# Patient Record
Sex: FEMALE | Race: WHITE | Employment: FULL TIME | URBAN - METROPOLITAN AREA
[De-identification: names, ages, dates, MRNs, and addresses within clinical notes are randomized per-mention and may not be internally consistent; named-entity substitution may affect disease eponyms.]

---

## 2019-04-13 ENCOUNTER — OFFICE VISIT (OUTPATIENT)
Dept: FAMILY MEDICINE CLINIC | Facility: CLINIC | Age: 64
End: 2019-04-13
Payer: MEDICARE

## 2019-04-13 VITALS
HEART RATE: 75 BPM | WEIGHT: 119.81 LBS | OXYGEN SATURATION: 99 % | BODY MASS INDEX: 18.58 KG/M2 | HEIGHT: 67.5 IN | SYSTOLIC BLOOD PRESSURE: 110 MMHG | TEMPERATURE: 98 F | RESPIRATION RATE: 18 BRPM | DIASTOLIC BLOOD PRESSURE: 74 MMHG

## 2019-04-13 DIAGNOSIS — L03.818 CELLULITIS OF OTHER SPECIFIED SITE: ICD-10-CM

## 2019-04-13 DIAGNOSIS — H00.012 HORDEOLUM EXTERNUM OF RIGHT LOWER EYELID: Primary | ICD-10-CM

## 2019-04-13 PROCEDURE — 99213 OFFICE O/P EST LOW 20 MIN: CPT | Performed by: FAMILY MEDICINE

## 2019-04-13 RX ORDER — CEFADROXIL 500 MG/1
500 CAPSULE ORAL 2 TIMES DAILY
Qty: 20 CAPSULE | Refills: 0 | Status: SHIPPED | OUTPATIENT
Start: 2019-04-13

## 2019-04-13 RX ORDER — NEOMYCIN SULFATE, POLYMYXIN B SULFATE, AND DEXAMETHASONE 3.5; 10000; 1 MG/G; [USP'U]/G; MG/G
1 OINTMENT OPHTHALMIC 4 TIMES DAILY
Qty: 1 TUBE | Refills: 0 | Status: SHIPPED | OUTPATIENT
Start: 2019-04-13

## 2019-04-13 NOTE — PATIENT INSTRUCTIONS
Cellulitis  Cellulitis is an infection of the deep layers of skin. A break in the skin, such as a cut or scratch, can let bacteria under the skin. If the bacteria get to deep layers of the skin, it can be serious.  If not treated, cellulitis can get into · Fever higher of 100.4º F (38.0º C) or higher after 2 days on antibiotics  Date Last Reviewed: 9/1/2016  © 8332-1841 [employer]. [address] [facility] [place], [address] [place] [address]. All rights reserved.  This information is not intended as a substitut · Drainage of blood or thick pus from the sty  · Blister on the eyelid  · Inability to open the eyelid due to swelling  · Fever of 100.4°F (38°C) or above, or as directed by your provider  · Vision changes  · Headache or stiff neck  · The sty comes back  D

## 2019-04-13 NOTE — PROGRESS NOTES
CHIEF COMPLAINT:   Patient presents with:  Eye Problem: R eye swollen, red x yesterday morning      HPI:   Anitra Bush is a 70year old female who presents with chief complaint of eye irritation. Symptoms began  2  days ago.  Symptoms have been n distress  SKIN: no rashes,no suspicious lesions  EYES: PERRLA, EOMI, normal optic disk, clear conjunctiva , no discharge. Left eye wnl, Right lower lid swollen, redden, and tender.  Small redden stye noted, no drg. redenss and swelling extends into the uppe

## 2019-04-16 DIAGNOSIS — H00.012 HORDEOLUM EXTERNUM OF RIGHT LOWER EYELID: ICD-10-CM

## 2019-04-16 RX ORDER — NEOMYCIN SULFATE, POLYMYXIN B SULFATE, AND DEXAMETHASONE 3.5; 10000; 1 MG/G; [USP'U]/G; MG/G
OINTMENT OPHTHALMIC
Qty: 3.5 G | Refills: 0 | OUTPATIENT
Start: 2019-04-16

## 2024-01-16 NOTE — PROGRESS NOTES
Ora Renee is a 68 year old female.    S:  Patient presents today with the following concerns:  Started today with urinary symptoms.  Pro-supplements.  Tries to avoid medications.  Urgency, frequency, dysuria.  Chills earlier.  Uncomfortable abdomen.  Bloated feeling.    No blood in urine.  No vaginal symptoms.     Current Outpatient Medications   Medication Sig Dispense Refill    cephalexin 500 MG Oral Cap Take 1 capsule (500 mg total) by mouth 2 (two) times daily for 5 days. 10 capsule 0     There is no problem list on file for this patient.    Family History   Problem Relation Age of Onset    Cancer Father     Hypertension Mother     Lipids Mother     Obesity Mother     Stroke Mother        REVIEW OF SYSTEMS:  GENERAL: feels well otherwise  SKIN: denies any unusual skin lesions  EYES:denies vision change  LUNGS: denies shortness of breath with exertion  CARDIOVASCULAR: denies chest pain on exertion  GI: denies abdominal pain.  No N/V/D/C  : see above.  MUSCULOSKELETAL: denies back pain  NEURO: denies headaches    EXAM:  /78   Pulse 74   Temp 98.1 °F (36.7 °C)   Resp 14   Ht 5' 7.5\" (1.715 m)   Wt 114 lb (51.7 kg)   SpO2 100%   BMI 17.59 kg/m²   GENERAL: well developed, well nourished,in no apparent distress.  Mood, affect, and behavior are normal.  SKIN: no rashes,no suspicious lesions  HEENT: atraumatic, normocephalic  EYES:PERRLA, EOMI  NECK: supple,no adenopathy  LUNGS: CTA, no RRW  CARDIO: RRR with grade 2-3 systolic murmur.  (Patient notes MVP and murmur associated with this)  EXTREMITIES: no edema  NEURO: Oriented times three,cranial nerves are intact,motor and sensory are grossly intact    ASSESSMENT AND PLAN:  Ora Renee is a 68 year old female.  Encounter Diagnosis   Name Primary?    UTI symptoms Yes       Orders Placed This Encounter   Procedures    Urine Dip, auto without Micro    Urine Culture, Routine [E]     Meds & Refills for this Visit:  Requested Prescriptions      Signed Prescriptions Disp Refills    cephalexin 500 MG Oral Cap 10 capsule 0     Sig: Take 1 capsule (500 mg total) by mouth 2 (two) times daily for 5 days.     Imaging & Consults:  None    Cephalexin as above.  Urine culture sent out.    Increase fluid intake, rest.  Go to ED with severe abdominal or back pain or high fevers.    Follow up if symptoms change, worsen, do not improve.  Continue to follow up with PCP regarding MVP.    Patient verbalizes understanding of plan.